# Patient Record
Sex: MALE | Race: WHITE | NOT HISPANIC OR LATINO | Employment: OTHER | URBAN - METROPOLITAN AREA
[De-identification: names, ages, dates, MRNs, and addresses within clinical notes are randomized per-mention and may not be internally consistent; named-entity substitution may affect disease eponyms.]

---

## 2018-04-03 ENCOUNTER — APPOINTMENT (OUTPATIENT)
Dept: CT IMAGING | Facility: HOSPITAL | Age: 63
End: 2018-04-03

## 2018-04-03 ENCOUNTER — HOSPITAL ENCOUNTER (INPATIENT)
Facility: HOSPITAL | Age: 63
LOS: 2 days | Discharge: HOME | End: 2018-04-05
Attending: EMERGENCY MEDICINE | Admitting: HOSPITALIST

## 2018-04-03 DIAGNOSIS — E86.0 DEHYDRATION: ICD-10-CM

## 2018-04-03 DIAGNOSIS — E87.29 ALCOHOLIC KETOACIDOSIS: ICD-10-CM

## 2018-04-03 DIAGNOSIS — F10.920 ALCOHOLIC INTOXICATION WITHOUT COMPLICATION (HCC): Primary | ICD-10-CM

## 2018-04-03 LAB
ALBUMIN SERPL-MCNC: 4.1 G/DL (ref 3.5–5.2)
ALBUMIN/GLOB SERPL: 1.5 G/DL
ALP SERPL-CCNC: 58 U/L (ref 39–117)
ALT SERPL W P-5'-P-CCNC: 46 U/L (ref 1–41)
AMPHET+METHAMPHET UR QL: NEGATIVE
ANION GAP SERPL CALCULATED.3IONS-SCNC: 28.5 MMOL/L
AST SERPL-CCNC: 46 U/L (ref 1–40)
BARBITURATES UR QL SCN: NEGATIVE
BASOPHILS # BLD AUTO: 0.06 10*3/MM3 (ref 0–0.2)
BASOPHILS NFR BLD AUTO: 0.6 % (ref 0–1.5)
BENZODIAZ UR QL SCN: POSITIVE
BILIRUB SERPL-MCNC: 0.5 MG/DL (ref 0.1–1.2)
BUN BLD-MCNC: 38 MG/DL (ref 8–23)
BUN/CREAT SERPL: 18.7 (ref 7–25)
CALCIUM SPEC-SCNC: 8.6 MG/DL (ref 8.6–10.5)
CANNABINOIDS SERPL QL: NEGATIVE
CHLORIDE SERPL-SCNC: 95 MMOL/L (ref 98–107)
CO2 SERPL-SCNC: 13.5 MMOL/L (ref 22–29)
COCAINE UR QL: NEGATIVE
CREAT BLD-MCNC: 2.03 MG/DL (ref 0.76–1.27)
DEPRECATED RDW RBC AUTO: 43.7 FL (ref 37–54)
EOSINOPHIL # BLD AUTO: 0.01 10*3/MM3 (ref 0–0.7)
EOSINOPHIL NFR BLD AUTO: 0.1 % (ref 0.3–6.2)
ERYTHROCYTE [DISTWIDTH] IN BLOOD BY AUTOMATED COUNT: 13.2 % (ref 11.5–14.5)
ETHANOL BLD-MCNC: 254 MG/DL (ref 0–10)
ETHANOL UR QL: 0.25 %
GFR SERPL CREATININE-BSD FRML MDRD: 34 ML/MIN/1.73
GLOBULIN UR ELPH-MCNC: 2.8 GM/DL
GLUCOSE BLD-MCNC: 70 MG/DL (ref 65–99)
HCT VFR BLD AUTO: 45.1 % (ref 40.4–52.2)
HGB BLD-MCNC: 15.4 G/DL (ref 13.7–17.6)
IMM GRANULOCYTES # BLD: 0.03 10*3/MM3 (ref 0–0.03)
IMM GRANULOCYTES NFR BLD: 0.3 % (ref 0–0.5)
LIPASE SERPL-CCNC: 81 U/L (ref 13–60)
LYMPHOCYTES # BLD AUTO: 3.69 10*3/MM3 (ref 0.9–4.8)
LYMPHOCYTES NFR BLD AUTO: 34.6 % (ref 19.6–45.3)
MCH RBC QN AUTO: 31.6 PG (ref 27–32.7)
MCHC RBC AUTO-ENTMCNC: 34.1 G/DL (ref 32.6–36.4)
MCV RBC AUTO: 92.4 FL (ref 79.8–96.2)
METHADONE UR QL SCN: NEGATIVE
MONOCYTES # BLD AUTO: 0.38 10*3/MM3 (ref 0.2–1.2)
MONOCYTES NFR BLD AUTO: 3.6 % (ref 5–12)
NEUTROPHILS # BLD AUTO: 6.5 10*3/MM3 (ref 1.9–8.1)
NEUTROPHILS NFR BLD AUTO: 60.8 % (ref 42.7–76)
OPIATES UR QL: NEGATIVE
OXYCODONE UR QL SCN: NEGATIVE
PLATELET # BLD AUTO: 241 10*3/MM3 (ref 140–500)
PMV BLD AUTO: 9.4 FL (ref 6–12)
POTASSIUM BLD-SCNC: 3.5 MMOL/L (ref 3.5–5.2)
PROT SERPL-MCNC: 6.9 G/DL (ref 6–8.5)
RBC # BLD AUTO: 4.88 10*6/MM3 (ref 4.6–6)
SODIUM BLD-SCNC: 137 MMOL/L (ref 136–145)
WBC NRBC COR # BLD: 10.67 10*3/MM3 (ref 4.5–10.7)

## 2018-04-03 PROCEDURE — 80307 DRUG TEST PRSMV CHEM ANLYZR: CPT | Performed by: EMERGENCY MEDICINE

## 2018-04-03 PROCEDURE — 85025 COMPLETE CBC W/AUTO DIFF WBC: CPT | Performed by: EMERGENCY MEDICINE

## 2018-04-03 PROCEDURE — 74176 CT ABD & PELVIS W/O CONTRAST: CPT

## 2018-04-03 PROCEDURE — 83690 ASSAY OF LIPASE: CPT | Performed by: EMERGENCY MEDICINE

## 2018-04-03 PROCEDURE — 80053 COMPREHEN METABOLIC PANEL: CPT | Performed by: EMERGENCY MEDICINE

## 2018-04-03 PROCEDURE — 25010000002 THIAMINE PER 100 MG: Performed by: EMERGENCY MEDICINE

## 2018-04-03 PROCEDURE — 99284 EMERGENCY DEPT VISIT MOD MDM: CPT

## 2018-04-03 PROCEDURE — 93010 ELECTROCARDIOGRAM REPORT: CPT | Performed by: INTERNAL MEDICINE

## 2018-04-03 PROCEDURE — 25010000002 MAGNESIUM SULFATE PER 500 MG OF MAGNESIUM: Performed by: EMERGENCY MEDICINE

## 2018-04-03 PROCEDURE — 93005 ELECTROCARDIOGRAM TRACING: CPT | Performed by: EMERGENCY MEDICINE

## 2018-04-03 RX ORDER — LISINOPRIL 10 MG/1
10 TABLET ORAL DAILY
COMMUNITY
End: 2018-04-05 | Stop reason: HOSPADM

## 2018-04-03 RX ADMIN — SODIUM CHLORIDE 1000 ML: 9 INJECTION, SOLUTION INTRAVENOUS at 19:03

## 2018-04-03 RX ADMIN — FOLIC ACID 1000 ML/HR: 5 INJECTION, SOLUTION INTRAMUSCULAR; INTRAVENOUS; SUBCUTANEOUS at 23:01

## 2018-04-03 NOTE — ED NOTES
Pt notified of need for urine specimen. IV fluids infusing.     Barby Kilpatrick RN  04/03/18 2944

## 2018-04-03 NOTE — ED PROVIDER NOTES
"EMERGENCY DEPARTMENT ENCOUNTER    CHIEF COMPLAINT  Chief Complaint: Abdominal pain  History given by: pt/ spouse is present at bedside   History limited by: N/A  Room Number: 05/05  PMD: Dale Kellogg MD      HPI:  Pt is a 62 y.o. male who presents complaining of constant diffuse abdominal pain that has been ongoing for approximately 3 days. Pt has a hx of alcohol abuse with heavy alcohol consumption that has been ongoing for approximately 2 weeks. Pt states his last alcoholic beverage was yesterday. Pt also c/o nausea/ dry-heaving, and diarrhea, but denies any other pertinent symptoms. Spouse reports decreased PO intake.     Duration: 3 days   Onset: gradual  Timing: constant   Location: diffuse   Radiation: None reported  Quality: \"pain\"  Intensity/Severity: moderate   Progression: unchanged   Associated Symptoms: N/V/D, decreased PO intake   Aggravating Factors: Heavily drinking for approximately 2 weeks   Alleviating Factors: None reported   Previous Episodes: Pt has a hx of alcohol abuse.   Treatment before arrival: Pt states his last alcoholic beverage was yesterday.    PAST MEDICAL HISTORY  Active Ambulatory Problems     Diagnosis Date Noted   • No Active Ambulatory Problems     Resolved Ambulatory Problems     Diagnosis Date Noted   • No Resolved Ambulatory Problems     Past Medical History:   Diagnosis Date   • Hyperlipidemia    • Hypertension        PAST SURGICAL HISTORY  Past Surgical History:   Procedure Laterality Date   • SHOULDER SURGERY     • TOTAL HIP ARTHROPLASTY         FAMILY HISTORY  History reviewed. No pertinent family history.    SOCIAL HISTORY  Social History     Social History   • Marital status:      Spouse name: N/A   • Number of children: N/A   • Years of education: N/A     Occupational History   • Not on file.     Social History Main Topics   • Smoking status: Current Every Day Smoker     Packs/day: 0.50   • Smokeless tobacco: Not on file   • Alcohol use Yes      Comment: 2-3 " half pints whisky    • Drug use: No   • Sexual activity: Defer     Other Topics Concern   • Not on file     Social History Narrative   • No narrative on file       ALLERGIES  Review of patient's allergies indicates no known allergies.    REVIEW OF SYSTEMS  Review of Systems   Constitutional: Positive for appetite change (decreased PO intake ). Negative for chills and fever.   HENT: Negative.  Negative for sore throat.    Eyes: Negative.    Respiratory: Negative.  Negative for cough.    Cardiovascular: Negative.  Negative for chest pain.   Gastrointestinal: Positive for abdominal pain, diarrhea, nausea and vomiting.   Genitourinary: Negative.  Negative for dysuria.   Musculoskeletal: Negative.  Negative for back pain.   Skin: Negative.  Negative for rash.   Neurological: Negative.  Negative for headaches.       PHYSICAL EXAM  ED Triage Vitals   Temp Heart Rate Resp BP SpO2   04/03/18 1826 04/03/18 1826 04/03/18 1825 04/03/18 1832 04/03/18 1826   97.9 °F (36.6 °C) (!) 128 20 106/84 99 %      Temp src Heart Rate Source Patient Position BP Location FiO2 (%)   04/03/18 1826 04/03/18 1826 -- -- --   Tympanic Monitor          Physical Exam   Constitutional: He is oriented to person, place, and time and well-developed, well-nourished, and in no distress.   Current BP = 89/67   HENT:   Head: Normocephalic and atraumatic.   Dry oral mucosa    Eyes: EOM are normal. Pupils are equal, round, and reactive to light.   Neck: Normal range of motion. Neck supple.   Cardiovascular: Normal rate, regular rhythm, normal heart sounds and intact distal pulses.    Pulmonary/Chest: Effort normal and breath sounds normal. No respiratory distress.   Abdominal: Soft. There is tenderness (diffuse ). There is no rebound and no guarding.   Musculoskeletal: Normal range of motion. He exhibits no edema.   Neurological: He is alert and oriented to person, place, and time. He has normal sensation and normal strength.   Skin: Skin is warm and dry.    Psychiatric: Mood and affect normal.   Nursing note and vitals reviewed.      LAB RESULTS  Lab Results (last 24 hours)     Procedure Component Value Units Date/Time    CBC & Differential [[id number]] Collected:  04/03/18 1902    Specimen:  Blood Updated:  04/03/18 1915    Narrative:       The following orders were created for panel order CBC & Differential.  Procedure                               Abnormality         Status                     ---------                               -----------         ------                     CBC Auto Differential[[id number]]        Abnormal            Final result                 Please view results for these tests on the individual orders.    Comprehensive Metabolic Panel [[id number]]  (Abnormal) Collected:  04/03/18 1902    Specimen:  Blood Updated:  04/03/18 2113     Glucose 70 mg/dL      BUN 38 (H) mg/dL      Creatinine 2.03 (H) mg/dL      Sodium 137 mmol/L      Potassium 3.5 mmol/L      Chloride 95 (L) mmol/L      CO2 13.5 (L) mmol/L      Calcium 8.6 mg/dL      Total Protein 6.9 g/dL      Albumin 4.10 g/dL      ALT (SGPT) 46 (H) U/L      AST (SGOT) 46 (H) U/L      Alkaline Phosphatase 58 U/L      Total Bilirubin 0.5 mg/dL      eGFR Non African Amer 34 (L) mL/min/1.73      Globulin 2.8 gm/dL      A/G Ratio 1.5 g/dL      BUN/Creatinine Ratio 18.7     Anion Gap 28.5 mmol/L     Lipase [[id number]]  (Abnormal) Collected:  04/03/18 1902    Specimen:  Blood Updated:  04/03/18 1935     Lipase 81 (H) U/L     Ethanol [[id number]]  (Abnormal) Collected:  04/03/18 1902    Specimen:  Blood Updated:  04/03/18 1956     Ethanol 254 (C) mg/dL      Ethanol % 0.254 %     CBC Auto Differential [[id number]]  (Abnormal) Collected:  04/03/18 1902    Specimen:  Blood Updated:  04/03/18 1915     WBC 10.67 10*3/mm3      RBC 4.88 10*6/mm3      Hemoglobin 15.4 g/dL      Hematocrit 45.1 %      MCV 92.4 fL      MCH 31.6 pg      MCHC 34.1 g/dL      RDW 13.2 %      RDW-SD 43.7 fl      MPV 9.4 fL       Platelets 241 10*3/mm3      Neutrophil % 60.8 %      Lymphocyte % 34.6 %      Monocyte % 3.6 (L) %      Eosinophil % 0.1 (L) %      Basophil % 0.6 %      Immature Grans % 0.3 %      Neutrophils, Absolute 6.50 10*3/mm3      Lymphocytes, Absolute 3.69 10*3/mm3      Monocytes, Absolute 0.38 10*3/mm3      Eosinophils, Absolute 0.01 10*3/mm3      Basophils, Absolute 0.06 10*3/mm3      Immature Grans, Absolute 0.03 10*3/mm3     Urine Drug Screen - Urine, Clean Catch [[id number]]  (Abnormal) Collected:  04/03/18 1910    Specimen:  Urine from Urine, Clean Catch Updated:  04/03/18 1943     Amphet/Methamphet, Screen Negative     Barbiturates Screen, Urine Negative     Benzodiazepine Screen, Urine Positive (A)     Cocaine Screen, Urine Negative     Opiate Screen Negative     THC, Screen, Urine Negative     Methadone Screen, Urine Negative     Oxycodone Screen, Urine Negative    Narrative:       Negative Thresholds For Drugs Screened:     Amphetamines               500 ng/ml   Barbiturates               200 ng/ml   Benzodiazepines            100 ng/ml   Cocaine                    300 ng/ml   Methadone                  300 ng/ml   Opiates                    300 ng/ml   Oxycodone                  100 ng/ml   THC                        50 ng/ml    The Normal Value for all drugs tested is negative. This report includes final unconfirmed screening results to be used for medical treatment purposes only. Unconfirmed results must not be used for non-medical purposes such as employment or legal testing. Clinical consideration should be applied to any drug of abuse test, particulary when unconfirmed results are used.          I ordered the above labs and reviewed the results    RADIOLOGY  CT Abdomen Pelvis Without Contrast   Final Result   1.  Extensive fatty liver.   2. Colonic diverticulosis                   This study was performed with techniques to keep radiation doses as low   as reasonably achievable (ALARA). Individualized dose  reduction   techniques using automated exposure control or adjustment of mA and/or   kV according to the patient size were employed.        This report was finalized on 4/3/2018 9:40 PM by Reginaldo Segura MD.               I ordered the above noted radiological studies. Interpreted by radiologist. Reviewed by me in PACS.       PROCEDURES  Procedures  EKG           EKG time: 1917  Rhythm/Rate: sinus rhythm, 88  P waves and FL: first degree AV block  QRS, axis: normal   ST and T waves: Diffuse flattened T waves      Interpreted Contemporaneously by me, independently viewed  No previous for comparison     PROGRESS AND CONSULTS  ED Course   Comment By Time   10:15 PM  Patient here for abdominal pain with excessive alcohol consumption.  Is dehydrated and given fluids.  Appears to be in AKA.  Will start banana bag.  At risk for withdrawal.  Discussed with Dr. Fang who will admit Mike Li MD 04/03 2214 1850  EValuated pt and discussed that pt's hypotension is most likely due to decreased PO intake. Discussed concern for pancreatitis due to pt's alcohol abuse hx. Discussed plan to administer IVF fluids and to perform CT abdomen/ pelvis for further evaluation. PT agrees to plan.    1856  Ordered IVF and labs for further evaluation.     2057  Ordered CT abdomen/ pelvis without contrast due to elevated kidney function labs.     2149  Rechecked pt who is in no apparent distress. Discussed that CT abdomen/ pelvis shows fatty liver, but otherwise unremarkable. Discussed that pt is currently in AKA and has elevated kidney function labs indicative of dehydration. Discussed plan to admit pt. PT understands and agrees to plan, all questions addressed at this time.    2152  Ordered banana bag. Placed consult to A.     2214  Discussed pt's case with Dr. Fang [[facility]] who agrees to admit.     MEDICAL DECISION MAKING  Results were reviewed/discussed with the patient and they were also made aware of online access. Pt also  made aware that some labs, such as cultures, will not be resulted during ER visit and follow up with PMD is necessary.     MDM  Number of Diagnoses or Management Options     Amount and/or Complexity of Data Reviewed  Clinical lab tests: ordered and reviewed (Positive for Benzo, Ethanol = 254, Lipase = 81, BUN = 38, Creatinine= 2.03)  Tests in the radiology section of CPT®: ordered and reviewed (CT abdomen/ pelvis   FINDINGS ABDOMEN CT:  Extensive fatty liver. No nephrolithiasis or hydronephrosis. Remaining solid organs are otherwise unremarkable without benefit of IV contrast. Encompassed aorta is non-aneurysmal. No  ascites. Normal appendix.     FINDINGS PELVIS CT:  Extensive artifact from left hip arthroplasty. No large pelvic mass or fluid collection. Numerous colonic diverticuli. The GI tract not opacified for assessment but non obstructive in appearance.)  Tests in the medicine section of CPT®: ordered and reviewed (Refer to procedure )  Discussion of test results with the performing providers: yes (Dr. Fang ([facility]) )  Independent visualization of images, tracings, or specimens: yes           DIAGNOSIS  Final diagnoses:   Alcoholic intoxication without complication   Dehydration   Alcoholic ketoacidosis       DISPOSITION  ADMISSION    Discussed treatment plan and reason for admission with pt/family and admitting physician.  Pt/family voiced understanding of the plan for admission for further testing/treatment as needed.     Latest Documented Vital Signs:  As of 11:06 PM  BP- 94/73 HR- 105 Temp- 97.9 °F (36.6 °C) (Tympanic) O2 sat- 99%    --  Documentation assistance provided by jerrell Dominguez for Dr. Li.  Information recorded by the scribe was done at my direction and has been verified and validated by me.                   Presley Dominguez  04/03/18 4593       Mike Li MD  04/03/18 1999

## 2018-04-04 ENCOUNTER — APPOINTMENT (OUTPATIENT)
Dept: ULTRASOUND IMAGING | Facility: HOSPITAL | Age: 63
End: 2018-04-04

## 2018-04-04 PROBLEM — K29.20 ALCOHOLIC GASTRITIS: Status: ACTIVE | Noted: 2018-04-04

## 2018-04-04 PROBLEM — R11.2 NAUSEA & VOMITING: Status: ACTIVE | Noted: 2018-04-04

## 2018-04-04 PROBLEM — F10.939 ALCOHOL WITHDRAWAL (HCC): Status: ACTIVE | Noted: 2018-04-04

## 2018-04-04 PROBLEM — I95.9 HYPOTENSION: Status: ACTIVE | Noted: 2018-04-04

## 2018-04-04 PROBLEM — E83.39 HYPOPHOSPHATEMIA: Status: ACTIVE | Noted: 2018-04-04

## 2018-04-04 PROBLEM — R79.89 LFTS ABNORMAL: Status: ACTIVE | Noted: 2018-04-04

## 2018-04-04 PROBLEM — R00.0 TACHYCARDIA: Status: ACTIVE | Noted: 2018-04-04

## 2018-04-04 PROBLEM — N17.9 ARF (ACUTE RENAL FAILURE) (HCC): Status: ACTIVE | Noted: 2018-04-04

## 2018-04-04 LAB
ALBUMIN SERPL-MCNC: 3.5 G/DL (ref 3.5–5.2)
ALBUMIN/GLOB SERPL: 1.5 G/DL
ALP SERPL-CCNC: 52 U/L (ref 39–117)
ALT SERPL W P-5'-P-CCNC: 32 U/L (ref 1–41)
ANION GAP SERPL CALCULATED.3IONS-SCNC: 25 MMOL/L
AST SERPL-CCNC: 36 U/L (ref 1–40)
BILIRUB SERPL-MCNC: 0.8 MG/DL (ref 0.1–1.2)
BUN BLD-MCNC: 28 MG/DL (ref 8–23)
BUN/CREAT SERPL: 26.9 (ref 7–25)
CALCIUM SPEC-SCNC: 7.6 MG/DL (ref 8.6–10.5)
CHLORIDE SERPL-SCNC: 98 MMOL/L (ref 98–107)
CO2 SERPL-SCNC: 14 MMOL/L (ref 22–29)
CREAT BLD-MCNC: 1.04 MG/DL (ref 0.76–1.27)
GFR SERPL CREATININE-BSD FRML MDRD: 73 ML/MIN/1.73
GLOBULIN UR ELPH-MCNC: 2.3 GM/DL
GLUCOSE BLD-MCNC: 50 MG/DL (ref 65–99)
GLUCOSE BLDC GLUCOMTR-MCNC: 69 MG/DL (ref 70–130)
GLUCOSE BLDC GLUCOMTR-MCNC: 79 MG/DL (ref 70–130)
GLUCOSE BLDC GLUCOMTR-MCNC: 90 MG/DL (ref 70–130)
MAGNESIUM SERPL-MCNC: 2.4 MG/DL (ref 1.6–2.4)
PHOSPHATE SERPL-MCNC: 1.9 MG/DL (ref 2.5–4.5)
POTASSIUM BLD-SCNC: 4 MMOL/L (ref 3.5–5.2)
PROT SERPL-MCNC: 5.8 G/DL (ref 6–8.5)
SODIUM BLD-SCNC: 137 MMOL/L (ref 136–145)

## 2018-04-04 PROCEDURE — 84100 ASSAY OF PHOSPHORUS: CPT | Performed by: HOSPITALIST

## 2018-04-04 PROCEDURE — 25010000002 THIAMINE PER 100 MG: Performed by: HOSPITALIST

## 2018-04-04 PROCEDURE — 25010000002 ONDANSETRON PER 1 MG: Performed by: HOSPITALIST

## 2018-04-04 PROCEDURE — 83735 ASSAY OF MAGNESIUM: CPT | Performed by: HOSPITALIST

## 2018-04-04 PROCEDURE — 76705 ECHO EXAM OF ABDOMEN: CPT

## 2018-04-04 PROCEDURE — 90791 PSYCH DIAGNOSTIC EVALUATION: CPT | Performed by: SOCIAL WORKER

## 2018-04-04 PROCEDURE — 25010000002 MAGNESIUM SULFATE PER 500 MG OF MAGNESIUM: Performed by: HOSPITALIST

## 2018-04-04 PROCEDURE — 80053 COMPREHEN METABOLIC PANEL: CPT | Performed by: HOSPITALIST

## 2018-04-04 PROCEDURE — 25010000002 LORAZEPAM PER 2 MG: Performed by: HOSPITALIST

## 2018-04-04 PROCEDURE — 82962 GLUCOSE BLOOD TEST: CPT

## 2018-04-04 RX ORDER — LORAZEPAM 1 MG/1
2 TABLET ORAL
Status: DISCONTINUED | OUTPATIENT
Start: 2018-04-04 | End: 2018-04-05 | Stop reason: HOSPADM

## 2018-04-04 RX ORDER — HYDROCODONE BITARTRATE AND ACETAMINOPHEN 7.5; 325 MG/1; MG/1
1 TABLET ORAL EVERY 6 HOURS PRN
Status: DISCONTINUED | OUTPATIENT
Start: 2018-04-04 | End: 2018-04-05 | Stop reason: HOSPADM

## 2018-04-04 RX ORDER — SODIUM CHLORIDE 9 MG/ML
125 INJECTION, SOLUTION INTRAVENOUS CONTINUOUS
Status: DISCONTINUED | OUTPATIENT
Start: 2018-04-04 | End: 2018-04-05 | Stop reason: HOSPADM

## 2018-04-04 RX ORDER — THIAMINE MONONITRATE (VIT B1) 100 MG
100 TABLET ORAL DAILY
Status: DISCONTINUED | OUTPATIENT
Start: 2018-04-04 | End: 2018-04-05 | Stop reason: HOSPADM

## 2018-04-04 RX ORDER — LORAZEPAM 2 MG/ML
2 INJECTION INTRAMUSCULAR
Status: DISCONTINUED | OUTPATIENT
Start: 2018-04-04 | End: 2018-04-05 | Stop reason: HOSPADM

## 2018-04-04 RX ORDER — FAMOTIDINE 10 MG/ML
20 INJECTION, SOLUTION INTRAVENOUS EVERY 12 HOURS SCHEDULED
Status: DISCONTINUED | OUTPATIENT
Start: 2018-04-04 | End: 2018-04-05 | Stop reason: HOSPADM

## 2018-04-04 RX ORDER — NITROGLYCERIN 0.4 MG/1
0.4 TABLET SUBLINGUAL
Status: DISCONTINUED | OUTPATIENT
Start: 2018-04-04 | End: 2018-04-05 | Stop reason: HOSPADM

## 2018-04-04 RX ORDER — FOLIC ACID 1 MG/1
1 TABLET ORAL DAILY
Status: DISCONTINUED | OUTPATIENT
Start: 2018-04-04 | End: 2018-04-05 | Stop reason: HOSPADM

## 2018-04-04 RX ORDER — CHLORDIAZEPOXIDE HYDROCHLORIDE 25 MG/1
25 CAPSULE, GELATIN COATED ORAL EVERY 6 HOURS SCHEDULED
Status: DISCONTINUED | OUTPATIENT
Start: 2018-04-04 | End: 2018-04-04

## 2018-04-04 RX ORDER — DIPHENOXYLATE HYDROCHLORIDE AND ATROPINE SULFATE 2.5; .025 MG/1; MG/1
1 TABLET ORAL DAILY
Status: DISCONTINUED | OUTPATIENT
Start: 2018-04-04 | End: 2018-04-05 | Stop reason: HOSPADM

## 2018-04-04 RX ORDER — ACETAMINOPHEN 325 MG/1
650 TABLET ORAL EVERY 4 HOURS PRN
Status: DISCONTINUED | OUTPATIENT
Start: 2018-04-04 | End: 2018-04-05 | Stop reason: HOSPADM

## 2018-04-04 RX ORDER — ONDANSETRON 4 MG/1
4 TABLET, ORALLY DISINTEGRATING ORAL EVERY 6 HOURS PRN
Status: DISCONTINUED | OUTPATIENT
Start: 2018-04-04 | End: 2018-04-05 | Stop reason: HOSPADM

## 2018-04-04 RX ORDER — ONDANSETRON 2 MG/ML
4 INJECTION INTRAMUSCULAR; INTRAVENOUS EVERY 6 HOURS PRN
Status: DISCONTINUED | OUTPATIENT
Start: 2018-04-04 | End: 2018-04-05 | Stop reason: HOSPADM

## 2018-04-04 RX ORDER — LORAZEPAM 1 MG/1
1 TABLET ORAL
Status: DISCONTINUED | OUTPATIENT
Start: 2018-04-04 | End: 2018-04-05 | Stop reason: HOSPADM

## 2018-04-04 RX ORDER — ONDANSETRON 4 MG/1
4 TABLET, FILM COATED ORAL EVERY 6 HOURS PRN
Status: DISCONTINUED | OUTPATIENT
Start: 2018-04-04 | End: 2018-04-05 | Stop reason: HOSPADM

## 2018-04-04 RX ORDER — SODIUM CHLORIDE 0.9 % (FLUSH) 0.9 %
1-10 SYRINGE (ML) INJECTION AS NEEDED
Status: DISCONTINUED | OUTPATIENT
Start: 2018-04-04 | End: 2018-04-05 | Stop reason: HOSPADM

## 2018-04-04 RX ORDER — LORAZEPAM 2 MG/ML
1 INJECTION INTRAMUSCULAR
Status: DISCONTINUED | OUTPATIENT
Start: 2018-04-04 | End: 2018-04-05 | Stop reason: HOSPADM

## 2018-04-04 RX ADMIN — Medication 100 MG: at 08:14

## 2018-04-04 RX ADMIN — Medication 1 TABLET: at 08:14

## 2018-04-04 RX ADMIN — DIBASIC SODIUM PHOSPHATE, MONOBASIC POTASSIUM PHOSPHATE AND MONOBASIC SODIUM PHOSPHATE 2 TABLET: 852; 155; 130 TABLET ORAL at 18:19

## 2018-04-04 RX ADMIN — FAMOTIDINE 20 MG: 10 INJECTION INTRAVENOUS at 03:53

## 2018-04-04 RX ADMIN — ACETAMINOPHEN 650 MG: 325 TABLET ORAL at 08:14

## 2018-04-04 RX ADMIN — LORAZEPAM 1 MG: 2 INJECTION INTRAMUSCULAR; INTRAVENOUS at 08:46

## 2018-04-04 RX ADMIN — SODIUM CHLORIDE 125 ML/HR: 9 INJECTION, SOLUTION INTRAVENOUS at 01:37

## 2018-04-04 RX ADMIN — FOLIC ACID 100 ML/HR: 5 INJECTION, SOLUTION INTRAMUSCULAR; INTRAVENOUS; SUBCUTANEOUS at 12:00

## 2018-04-04 RX ADMIN — ACETAMINOPHEN 650 MG: 325 TABLET ORAL at 20:29

## 2018-04-04 RX ADMIN — DIBASIC SODIUM PHOSPHATE, MONOBASIC POTASSIUM PHOSPHATE AND MONOBASIC SODIUM PHOSPHATE 2 TABLET: 852; 155; 130 TABLET ORAL at 20:30

## 2018-04-04 RX ADMIN — FAMOTIDINE 20 MG: 10 INJECTION INTRAVENOUS at 20:30

## 2018-04-04 RX ADMIN — ONDANSETRON 4 MG: 2 INJECTION INTRAMUSCULAR; INTRAVENOUS at 08:14

## 2018-04-04 RX ADMIN — FOLIC ACID 1 MG: 1 TABLET ORAL at 08:14

## 2018-04-04 RX ADMIN — CHLORDIAZEPOXIDE HYDROCHLORIDE 25 MG: 25 CAPSULE ORAL at 06:01

## 2018-04-04 RX ADMIN — DIBASIC SODIUM PHOSPHATE, MONOBASIC POTASSIUM PHOSPHATE AND MONOBASIC SODIUM PHOSPHATE 2 TABLET: 852; 155; 130 TABLET ORAL at 12:09

## 2018-04-04 RX ADMIN — LORAZEPAM 1 MG: 2 INJECTION INTRAMUSCULAR; INTRAVENOUS at 20:29

## 2018-04-04 RX ADMIN — CHLORDIAZEPOXIDE HYDROCHLORIDE 25 MG: 25 CAPSULE ORAL at 01:37

## 2018-04-04 RX ADMIN — FAMOTIDINE 20 MG: 10 INJECTION INTRAVENOUS at 08:14

## 2018-04-04 NOTE — PLAN OF CARE
Problem: Patient Care Overview  Goal: Plan of Care Review  Outcome: Ongoing (interventions implemented as appropriate)   04/04/18 0359   Coping/Psychosocial   Plan of Care Reviewed With patient   Plan of Care Review   Progress no change   OTHER   Outcome Summary Pt admitted for ARF. Also alcohol withdrawal. Librium started. VSS. Will continue to monitor.     Goal: Individualization and Mutuality  Outcome: Ongoing (interventions implemented as appropriate)    Goal: Discharge Needs Assessment  Outcome: Ongoing (interventions implemented as appropriate)      Problem: Alcohol Withdrawal Acute, Risk/Actual (Adult)  Goal: Signs and Symptoms of Listed Potential Problems Will be Absent, Minimized or Managed (Alcohol Withdrawal Acute, Risk/Actual)  Outcome: Ongoing (interventions implemented as appropriate)      Problem: Fluid Volume Deficit (Adult)  Goal: Identify Related Risk Factors and Signs and Symptoms  Outcome: Ongoing (interventions implemented as appropriate)    Goal: Optimal Fluid Balance  Outcome: Ongoing (interventions implemented as appropriate)

## 2018-04-04 NOTE — H&P
HISTORY AND PHYSICAL   [facility]        Patient Identification:  Name: Dylon Lux  Age: 62 y.o.  Sex: male  :  1955  MRN: [id number]                     Primary Care Physician: Dale Kellogg MD    Chief Complaint:  Nausea vomiting and abdominal pain    History of Present Illness:   Mr Lux is a 62-year-old male who has a relatively benign past medical history of which he takes lisinopril for hypertension.  Patient unfortunately has been dealing with alcohol abuse his whole life.  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  Patient with his sobriety last year for approximate 3-4 months through .  Unfortunately he went back to alcohol abuse but did so secretively as his family was unaware.  Patient is now consuming 1-2+ pints of bourbon on a daily basis.  Last alcoholic beverage was on Monday.  Patient admits to anxieties and agitation but denies any hallucination.  Patient has since been complaining of nausea vomiting of about a dozen spells a day.  He does not admit to any hematemesis.  He does not admit any recent NSAID use.  He still has his gallbladder.  He denies fever chills or night sweats.  In the ER was started on IV fluids and administered a banana bag as well as anti-emetics.  He was also found to be hypotensive but seems to be responded to IV fluids.  Patient denies any bowel habitus changes such as bright red blood per rectum or melena.  He admits to abdominal pain which is generalized.    Past Medical History:  Past Medical History:   Diagnosis Date   • Hyperlipidemia    • Hypertension      Past Surgical History:  Past Surgical History:   Procedure Laterality Date   • SHOULDER SURGERY     • TOTAL HIP ARTHROPLASTY        Home Meds:  Prescriptions Prior to Admission   Medication Sig Dispense Refill Last Dose   • lisinopril (PRINIVIL,ZESTRIL) 10 MG tablet Take 10 mg by mouth Daily.   4/3/2018 at 0500       Allergies:  No Known  "Allergies  Immunizations:    There is no immunization history on file for this patient.  Social History:   Social History     Social History Narrative   • No narrative on file     Social History     Social History   • Marital status:      Spouse name: N/A   • Number of children: N/A   • Years of education: N/A     Occupational History   • Not on file.     Social History Main Topics   • Smoking status: Current Every Day Smoker     Packs/day: 0.50   • Smokeless tobacco: Not on file   • Alcohol use Yes      Comment: 2-3 half pints whisky    • Drug use: No   • Sexual activity: Defer     Other Topics Concern   • Not on file     Social History Narrative   • No narrative on file       Family History:  History reviewed. No pertinent family history.     Review of Systems  See history of present illness and past medical history.  Patient denies any fever chills night sweats.  Denies focal changes in vision smell taste or sound.  Denies any loss of consciousness.  Denies any chest pain palpitations cough shortness of breath admits to nausea vomiting abdominal pain denies any hematemesis.  Denies any bowel changes such as melena or bright red blood per rectum.  Denies any bruising bleeding or focal loss of function.  Remainder of ROS is negative.    Objective:  tMax 24 hrs: Temp (24hrs), Av.8 °F (36.6 °C), Min:97.7 °F (36.5 °C), Max:97.9 °F (36.6 °C)    Vitals Ranges:   Temp:  [97.7 °F (36.5 °C)-97.9 °F (36.6 °C)] 97.7 °F (36.5 °C)  Heart Rate:  [102-128] 102  Resp:  [18-20] 20  BP: ()/(60-84) 113/79      Exam:  /79 (BP Location: Left arm, Patient Position: Lying)   Pulse 102   Temp 97.7 °F (36.5 °C) (Oral)   Resp 20   Ht 182.9 cm (72\")   Wt 83.9 kg (185 lb)   SpO2 100%   BMI 25.09 kg/m²     General Appearance:    Alert, cooperative, no distress, Not combative, remorseful, alert and oriented ×3, not toxic, no family present at bedside    Head:    Normocephalic, without obvious abnormality, " atraumatic   Eyes:    PERRL, conjunctiva/corneas clear, EOM's intact, both eyes   Ears:    Normal external ear canals, both ears   Nose:   Nares normal, septum midline, mucosa normal, no drainage    or sinus tenderness   Throat:   Lips, mucosa, and tongue normal though mucus membranes are dry   Neck:   Supple, no LAD or JVD       Lungs:     Clear to auscultation bilaterally, respirations unlabored   Chest Wall:    No tenderness or deformity    Heart:    Tachy rate and rhythm, S1 and S2 normal, no murmur   Abdomen:     Soft, Non-impressive abdomen regarding tenderness to palpation as he was still compensating with me while I was palpating his abdomen with no aspects of pain, bowel sounds active all four quadrants, no masses, no hepatomegaly, no splenomegaly   Extremities:   Extremities normal, atraumatic, no cyanosis or edema   Pulses:   2+ and symmetric all extremities           Neurologic:   CNII-XII intact, normal strength, Moving all extremities without focal deficit, did not evaluate gait       .    Data Review:  Labs in chart were reviewed.             Imaging Results (all)     Procedure Component Value Units Date/Time    CT Abdomen Pelvis Without Contrast [[id number]] Collected:  04/03/18 2138     Updated:  04/03/18 2143    Narrative:       NONCONTRAST CT SCANS ABDOMEN AND PELVIS     HISTORY: ab pain     COMPARISON: None.     TECHNIQUE:Radiation dose reduction techniques were utilized, including  automated exposure control and exposure modulation based on body size.   Axial images were obtained from the lung bases to the symphysis pubis  without oral or IV contrast per request.      FINDINGS ABDOMEN CT:  Extensive fatty liver. No nephrolithiasis or  hydronephrosis. Remaining solid organs are otherwise unremarkable  without benefit of IV contrast. Encompassed aorta is non-aneurysmal. No  ascites. Normal appendix.     FINDINGS PELVIS CT:  Extensive artifact from left hip arthroplasty. No  large pelvic mass or  fluid collection. Numerous colonic diverticuli. The  GI tract not opacified for assessment but non obstructive in appearance.                Impression:       1.  Extensive fatty liver.  2. Colonic diverticulosis              This study was performed with techniques to keep radiation doses as low  as reasonably achievable (ALARA). Individualized dose reduction  techniques using automated exposure control or adjustment of mA and/or  kV according to the patient size were employed.      This report was finalized on 4/3/2018 9:40 PM by Reginaldo Segura MD.               Assessment:  Principal Problem:    ARF (acute renal failure)  Active Problems:    Alcoholic intoxication without complication    Nausea & vomiting    Alcoholic gastritis    Hypotension    Tachycardia    LFTs abnormal      Plan:  ARF - likely prerenal exacerbated by N/V (w/out hematemesis)   -monitor crt w/ Mg/Phos    -IVF   -hold ACE   -renal consult if no improvement w/ am labs    Hypotension - IVF/monitor    AA w/out DT's - scheduled Librium - s/p banana bag   -add MVI/folate/thiamine   -Access    N/V w/ probable alcohol induced gastritis    -bowel/clears   -IV Pepcid    -GI consult if declines or develops hematemesis   -check RUQ U/S    LFTs abnormal secondary to the above    Tachycardia - EKG reviewed - expected w/ detox - hold metoprolol or rate limiting drug for now secondary to hypotension    SCDs for DVT ppx    Further recommendations to follow as clinical course unfolds    Dickson Fang MD  4/4/2018  12:11 AM

## 2018-04-04 NOTE — PLAN OF CARE
Problem: Patient Care Overview  Goal: Plan of Care Review  Outcome: Ongoing (interventions implemented as appropriate)   04/04/18 1812   Coping/Psychosocial   Plan of Care Reviewed With patient   Plan of Care Review   Progress no change   OTHER   Outcome Summary CIWAS at highest 10 this AM, Banana bag hung, CIWA down to 3 this afternoon, VSS, will continue to monitor.       Problem: Alcohol Withdrawal Acute, Risk/Actual (Adult)  Goal: Signs and Symptoms of Listed Potential Problems Will be Absent, Minimized or Managed (Alcohol Withdrawal Acute, Risk/Actual)  Outcome: Ongoing (interventions implemented as appropriate)      Problem: Fluid Volume Deficit (Adult)  Goal: Identify Related Risk Factors and Signs and Symptoms  Outcome: Outcome(s) achieved Date Met: 04/04/18    Goal: Optimal Fluid Balance  Outcome: Ongoing (interventions implemented as appropriate)      Problem: Fall Risk (Adult)  Goal: Identify Related Risk Factors and Signs and Symptoms  Outcome: Outcome(s) achieved Date Met: 04/04/18    Goal: Absence of Fall  Outcome: Ongoing (interventions implemented as appropriate)

## 2018-04-04 NOTE — PROGRESS NOTES
"DAILY PROGRESS NOTE  [facility]    Patient Identification:  Name: Dylon Lux  Age: 62 y.o.  Sex: male  :  1955  MRN: [id number]         Primary Care Physician: Dale Kellogg MD      Subjective  No new c/o at present but was having withdrawal sympt earlier this AM and full detox protocol started.  Overall feeling better now.  No further abd pain.  (states it was lower abd).  No BM since adm.      Objective:  General Appearance:  Comfortable, well-appearing, in no acute distress and not in pain.    Vital signs: (most recent): Blood pressure 143/97, pulse 91, temperature 98 °F (36.7 °C), temperature source Oral, resp. rate 20, height 182.9 cm (72\"), weight 83.9 kg (185 lb), SpO2 98 %.    Lungs:  Normal effort and normal respiratory rate.  Breath sounds clear to auscultation.    Heart: Normal rate.  Regular rhythm.    Abdomen: Abdomen is soft and non-distended.  Bowel sounds are normal.   There is no abdominal tenderness.     Extremities: There is no dependent edema.    Neurological: Patient is alert and oriented to person, place and time.    Skin:  Warm and dry.  (Bruise rt lateral zygomatic arch (from  last Monday).)              Vital signs in last 24 hours:  Temp:  [97.5 °F (36.4 °C)-98 °F (36.7 °C)] 98 °F (36.7 °C)  Heart Rate:  [] 91  Resp:  [18-20] 20  BP: ()/(60-97) 143/97    Intake/Output:    Intake/Output Summary (Last 24 hours) at 18 0929  Last data filed at 18 0137   Gross per 24 hour   Intake             2000 ml   Output                0 ml   Net             2000 ml           Results from last 7 days  Lab Units 18  1902   WBC 10*3/mm3 10.67   HEMOGLOBIN g/dL 15.4   PLATELETS 10*3/mm3 241     Results from last 7 days  Lab Units 18  0645 18  1902   SODIUM mmol/L 137 137   POTASSIUM mmol/L 4.0 3.5   CHLORIDE mmol/L 98 95*   CO2 mmol/L 14.0* 13.5*   BUN mg/dL 28* 38*   CREATININE mg/dL 1.04 2.03*   GLUCOSE mg/dL 50* 70   Estimated " Creatinine Clearance: 89.6 mL/min (by C-G formula based on SCr of 1.04 mg/dL).  Results from last 7 days  Lab Units 04/04/18  0645 04/03/18  1902   CALCIUM mg/dL 7.6* 8.6   ALBUMIN g/dL 3.50 4.10   MAGNESIUM mg/dL 2.4  --    PHOSPHORUS mg/dL 1.9*  --      Results from last 7 days  Lab Units 04/04/18  0645 04/03/18  1902   ALBUMIN g/dL 3.50 4.10   BILIRUBIN mg/dL 0.8 0.5   ALK PHOS U/L 52 58   AST (SGOT) U/L 36 46*   ALT (SGPT) U/L 32 46*       Assessment:  Principal Problem:    ARF (acute renal failure) - prerenal:  Improving.   Active Problems:    Etoh withdrawal w hx/o DTs:  Full protocol initiated.     Alcoholic intoxication without complication    Nausea & vomiting    Alcoholic gastritis    Hypotension:  Resolved.     Tachycardia - Improved w hydration.  Expect further issues w withdrawal.     LFTs abnormal - resolved.     Hypophos:  PO replacement.     Elevated Lipase:  No clinical indication of pancreatitis.  Recheck in AM.     Plan:  Please see above.     Clark Gilbert MD  4/4/2018  9:29 AM

## 2018-04-04 NOTE — CONSULTS
"61 y/o cauc  father of 2 adult sons who came to the ED w/ c/o's of abdominal pain that had been ongoing for 2 weeks.  He reports that he has been consuming up to 1.5 pints of liquor / day , with his last drink 2 days ago.  Patient had and ETOH level of 294 in ED last evening.  He denies any hx of sz or DT's when stopping his ETOH consumption.  He reports that he has had some nausea, poor PO intake, and difficulty w/ sleep.  He denies any SI or HI.  No hx of suicide attempts.  He reports he first began consuming ETOH around 20 or 21.  He states that his father drank ETOH a lot.  He tried marijuana in late teens but none since.     Patient reports no prior mental health tx.  He was in the IOP at [facility] July 2017.  He reports approx 60 days abstinent at the time. He did attend AA but never had a sponsor. \"I was not ready to open up\"  He reports that he liked the counselor at [facility] and states he learned a lot.  He states he was released a week early b/c of his progress.  Patient is not willing to commit to another program saying that he learned a lot.  He attended AA for several months after.  He did not get a sponsor.  He reports that his wife drinks wine occasionally.  His 2 adult sons are \"health nuts\" and work out a lot.  He His father drank a lot of ETOH.      Patient lives w/ his wife. He retired from [employer]. He is on SS disability due to shoulder issues.  He is not currently employed.  He likes golf and watching his g-son play ball during the summer.    Patient is alert and O x 4.  He denies any SI or HI.  No a/v hallucinations.  Latest CIWA scores were 10 and 8.  He was engaging.  Limited interest in JADEN tx after discharge at this time. Discussed case w/ Dr. Romo.    He was provided w/ a list of JADEN tx options in [place].HE was also provided education about relapse prevention.  Patient encouraged to consider returning to an IOP program as well as AA. He is open to returning to AA.  He was also " encouraged to get a sponsor and get telephone numbers of people to call.  He states that he was provided numbers when he attended AA before.  [facility] Center will follow.

## 2018-04-04 NOTE — PROGRESS NOTES
Discharge Planning Assessment  [facility]     Patient Name: Dylon Lux  MRN: [id number]  Today's Date: 4/4/2018    Admit Date: 4/3/2018          Discharge Needs Assessment     Row Name 04/04/18 1112       Living Environment    Lives With spouse    Current Living Arrangements home/apartment/condo    Primary Care Provided by self    Provides Primary Care For no one    Family Caregiver if Needed none    Able to Return to Prior Arrangements yes       Resource/Environmental Concerns    Resource/Environmental Concerns none    Transportation Concerns car, none       Transition Planning    Patient/Family Anticipates Transition to home    Patient/Family Anticipated Services at Transition none    Transportation Anticipated family or friend will provide;car, drives self       Discharge Needs Assessment    Readmission Within the Last 30 Days no previous admission in last 30 days    Concerns to be Addressed substance/tobacco abuse/use    Concerns Comments ACCESS following    Equipment Currently Used at Home none    Anticipated Changes Related to Illness none    Equipment Needed After Discharge none    Offered/Gave Vendor List no    Current Discharge Risk substance use/abuse    Discharge Coordination/Progress ACCESS following            Discharge Plan    No documentation.       Destination     No service coordination in this encounter.      Durable Medical Equipment     No service coordination in this encounter.      Dialysis/Infusion     No service coordination in this encounter.      Home Medical Care     No service coordination in this encounter.      Social Care     No service coordination in this encounter.                Demographic Summary     Row Name 04/04/18 1111       General Information    Admission Type inpatient    Arrived From home    Required Notices Provided Important Message from Medicare    Referral Source admission list    Reason for Consult discharge planning    Preferred Language English      Used During This Interaction no            Functional Status     Row Name 04/04/18 1112       Functional Status    Usual Activity Tolerance good    Current Activity Tolerance moderate       Functional Status, IADL    Medications independent    Meal Preparation independent    Housekeeping independent    Laundry independent    Shopping independent       Mental Status    General Appearance WDL WDL            Psychosocial    No documentation.           Abuse/Neglect    No documentation.           Legal    No documentation.           Substance Abuse    No documentation.           Patient Forms    No documentation.         Heydi Lindsey RN

## 2018-04-05 VITALS
OXYGEN SATURATION: 95 % | TEMPERATURE: 98 F | DIASTOLIC BLOOD PRESSURE: 97 MMHG | WEIGHT: 185 LBS | BODY MASS INDEX: 25.06 KG/M2 | HEART RATE: 65 BPM | SYSTOLIC BLOOD PRESSURE: 142 MMHG | HEIGHT: 72 IN | RESPIRATION RATE: 18 BRPM

## 2018-04-05 PROBLEM — F10.920 ALCOHOLIC INTOXICATION WITHOUT COMPLICATION (HCC): Status: RESOLVED | Noted: 2018-04-03 | Resolved: 2018-04-05

## 2018-04-05 PROBLEM — R00.0 TACHYCARDIA: Status: RESOLVED | Noted: 2018-04-04 | Resolved: 2018-04-05

## 2018-04-05 PROBLEM — I95.9 HYPOTENSION: Status: RESOLVED | Noted: 2018-04-04 | Resolved: 2018-04-05

## 2018-04-05 PROBLEM — R79.89 LFTS ABNORMAL: Status: RESOLVED | Noted: 2018-04-04 | Resolved: 2018-04-05

## 2018-04-05 PROBLEM — K70.0 FATTY LIVER, ALCOHOLIC: Status: ACTIVE | Noted: 2018-04-05

## 2018-04-05 LAB
INR PPP: 0.97 (ref 0.9–1.1)
MAGNESIUM SERPL-MCNC: 2.3 MG/DL (ref 1.6–2.4)
PHOSPHATE SERPL-MCNC: 2.6 MG/DL (ref 2.5–4.5)
PROTHROMBIN TIME: 12.6 SECONDS (ref 11.7–14.2)
TSH SERPL DL<=0.05 MIU/L-ACNC: 5.91 MIU/ML (ref 0.27–4.2)

## 2018-04-05 PROCEDURE — 84100 ASSAY OF PHOSPHORUS: CPT | Performed by: HOSPITALIST

## 2018-04-05 PROCEDURE — 85610 PROTHROMBIN TIME: CPT | Performed by: HOSPITALIST

## 2018-04-05 PROCEDURE — 84443 ASSAY THYROID STIM HORMONE: CPT | Performed by: HOSPITALIST

## 2018-04-05 PROCEDURE — 83735 ASSAY OF MAGNESIUM: CPT | Performed by: HOSPITALIST

## 2018-04-05 RX ORDER — LANOLIN ALCOHOL/MO/W.PET/CERES
100 CREAM (GRAM) TOPICAL DAILY
Qty: 30 TABLET | Refills: 0 | Status: SHIPPED | OUTPATIENT
Start: 2018-04-05

## 2018-04-05 RX ORDER — FAMOTIDINE 40 MG/1
40 TABLET, FILM COATED ORAL DAILY
Qty: 15 TABLET | Refills: 0 | Status: SHIPPED | OUTPATIENT
Start: 2018-04-05

## 2018-04-05 RX ORDER — FOLIC ACID 1 MG/1
1 TABLET ORAL DAILY
Qty: 30 TABLET | Refills: 0 | Status: SHIPPED | OUTPATIENT
Start: 2018-04-05

## 2018-04-05 RX ORDER — DIPHENOXYLATE HYDROCHLORIDE AND ATROPINE SULFATE 2.5; .025 MG/1; MG/1
1 TABLET ORAL DAILY
Qty: 30 TABLET | Refills: 0 | Status: SHIPPED | OUTPATIENT
Start: 2018-04-05

## 2018-04-05 NOTE — PROGRESS NOTES
Case Management Discharge Note    Final Note: Patient has been DC'd home.    Destination     No service coordination in this encounter.      Durable Medical Equipment     No service coordination in this encounter.      Dialysis/Infusion     No service coordination in this encounter.      Home Medical Care     No service coordination in this encounter.      Social Care     No service coordination in this encounter.        Other: Other (Private car)    Final Discharge Disposition Code: 01 - home or self-care

## 2018-04-05 NOTE — PLAN OF CARE
Problem: Patient Care Overview  Goal: Plan of Care Review  Outcome: Ongoing (interventions implemented as appropriate)   04/05/18 0510   Coping/Psychosocial   Plan of Care Reviewed With patient;spouse   Plan of Care Review   Progress no change   OTHER   Outcome Summary CIWA highest 6 this shift. New peripheral IV placed.Fluids infusing. Resting well.after onedose of ativan.VSS. Will continnue to monitor closely.     Goal: Individualization and Mutuality  Outcome: Ongoing (interventions implemented as appropriate)    Goal: Discharge Needs Assessment  Outcome: Ongoing (interventions implemented as appropriate)      Problem: Alcohol Withdrawal Acute, Risk/Actual (Adult)  Goal: Signs and Symptoms of Listed Potential Problems Will be Absent, Minimized or Managed (Alcohol Withdrawal Acute, Risk/Actual)  Outcome: Ongoing (interventions implemented as appropriate)      Problem: Fluid Volume Deficit (Adult)  Goal: Optimal Fluid Balance  Outcome: Ongoing (interventions implemented as appropriate)      Problem: Fall Risk (Adult)  Goal: Absence of Fall  Outcome: Ongoing (interventions implemented as appropriate)

## 2018-04-05 NOTE — DISCHARGE SUMMARY
NAME: Dylon Lux ADMIT: 4/3/2018   : 1955  PCP: Dale Kellogg MD    MRN: [id number] LOS: 2 days   AGE/SEX: 62 y.o. male  ROOM: 2/     Date of Admission:  4/3/2018  Date of Discharge:  2018    PCP: Dale Kellogg MD    CHIEF COMPLAINT  Abdominal Pain      DISCHARGE DIAGNOSIS  Active Hospital Problems (** Indicates Principal Problem)    Diagnosis Date Noted   • **ARF (acute renal failure) [N17.9] 2018   • Fatty liver, alcoholic [K70.0] 2018   • Nausea & vomiting [R11.2] 2018   • Alcoholic gastritis [K29.20] 2018   • Alcohol withdrawal [F10.239] 2018   • Hypophosphatemia [E83.39] 2018      Resolved Hospital Problems    Diagnosis Date Noted Date Resolved   • Hypotension [I95.9] 2018   • Tachycardia [R00.0] 2018   • LFTs abnormal [R79.89] 2018   • Alcoholic intoxication without complication [F10.920] 2018       SECONDARY DIAGNOSES  Past Medical History:   Diagnosis Date   • Alcoholism    • Hyperlipidemia    • Hypertension    • Withdrawal symptoms, alcohol        HOSPITAL COURSE  Patient is a 62 y.o. male with history of HTN and alcohol abuse. He had a period of sobriety last year but more recent was back to drinking daily bourbon. He presented to [facility] ER with N/V. He was found to be tachycardic and borderline hypotensive SBPs in the 90s in the ER. He had TERESITA with Cr of 2.    He was admitted. Given IVFs and PPI/Antiemetics for TERESITA and alcoholic gastritis. He was given a couple of doses of benzodiazepam but did not go through any significant withdrawal. He was seen by the [facility]iat[facility] who counseled on recources to alcohol cessation.    By  his renal function returned to normal. He was feeling much better and wished for discharge. TSH is borderline-will have him see Dale Kellogg MD for recheck in the next month. Will have him hold lisinopril currently. He has fatty liver  seen on imaging have counseled him on weight loss and alcohol cessation.    DIAGNOSTICS    US Gallbladder [[id number]] Gerry as Reviewed   Order Status: Completed Collected: 04/04/18 0710    Updated: 04/04/18 0710   Narrative:     LIMITED ABDOMINAL ULTRASOUND     HISTORY: N/V/abdominal pains; F10.920-Alcohol use, unspecified with  intoxication, uncomplicated; E86.0-Dehydration; E87.2-Acidosis     FINDINGS: Longitudinal and transverse images of the right upper quadrant  were obtained. Liver parenchyma is extensively diffusely hyperechoic  suggesting fatty infiltration. There is no focal hepatic mass or biliary  dilatation demonstrated. Gallbladder shows no evidence of cholelithiasis  or wall thickening. There is no pericholecystic fluid. Limited views of  the right kidney and pancreas are unremarkable.          Impression:     1. Extensive fatty infiltration of the liver.  2. Unremarkable gallbladder.             CT Abdomen Pelvis Without Contrast [[id number]] Gerry as Reviewed   Order Status: Completed Collected: 04/03/18 2138    Updated: 04/03/18 2143   Narrative:     NONCONTRAST CT SCANS ABDOMEN AND PELVIS     HISTORY: ab pain     COMPARISON: None.     TECHNIQUE:Radiation dose reduction techniques were utilized, including  automated exposure control and exposure modulation based on body size.   Axial images were obtained from the lung bases to the symphysis pubis  without oral or IV contrast per request.      FINDINGS ABDOMEN CT:  Extensive fatty liver. No nephrolithiasis or  hydronephrosis. Remaining solid organs are otherwise unremarkable  without benefit of IV contrast. Encompassed aorta is non-aneurysmal. No  ascites. Normal appendix.     FINDINGS PELVIS CT:  Extensive artifact from left hip arthroplasty. No  large pelvic mass or fluid collection. Numerous colonic diverticuli. The  GI tract not opacified for assessment but non obstructive in appearance.               Impression:     1.  Extensive fatty liver.  2.  Colonic diverticulosis             TSH [[id number]] (Abnormal) Collected: 04/05/18 0426   Lab Status: Final result Specimen: Blood Updated: 04/05/18 0619    TSH 5.910 (H) mIU/mL    Phosphorus [[id number]] (Normal) Collected: 04/05/18 0426   Lab Status: Final result Specimen: Blood Updated: 04/05/18 0626    Phosphorus 2.6 mg/dL    Magnesium [[id number]] (Normal) Collected: 04/05/18 0426   Lab Status: Final result Specimen: Blood Updated: 04/05/18 0559    Magnesium 2.3 mg/dL    Protime-INR [[id number]] (Normal) Collected: 04/05/18 0426   Lab Status: Final result Specimen: Blood Updated: 04/05/18 0539    Protime 12.6 Seconds     INR 0.97   Comprehensive Metabolic Panel [[id number]] (Abnormal) Collected: 04/04/18 0645   Lab Status: Final result Specimen: Blood Updated: 04/04/18 0820    Glucose 50 (L) mg/dL     BUN 28 (H) mg/dL     Creatinine 1.04 mg/dL     Sodium 137 mmol/L     Potassium 4.0 mmol/L     Chloride 98 mmol/L     CO2 14.0 (L) mmol/L     Calcium 7.6 (L) mg/dL     Total Protein 5.8 (L) g/dL     Albumin 3.50 g/dL     ALT (SGPT) 32 U/L     AST (SGOT) 36 U/L     Alkaline Phosphatase 52 U/L     Total Bilirubin 0.8 mg/dL     eGFR Non African Amer 73 mL/min/1.73     Globulin 2.3 gm/dL     A/G Ratio 1.5 g/dL     BUN/Creatinine Ratio 26.9 (H)    Anion Gap 25.0 mmol/L        PHYSICAL EXAM  Objective     Alert, nad  Soft, nt  No resp distress  RRR  No tremor or signs of w/d    CONDITION ON DISCHARGE  Stable.      DISCHARGE DISPOSITION   Home or Self Care      DISCHARGE MEDICATIONS   Dylon Lux   Home Medication Instructions ARNOLD:[id number]    Printed on:04/05/18 0905   Medication Information                      famotidine (PEPCID) 40 MG tablet  Take 1 tablet by mouth Daily.             folic acid (FOLVITE) 1 MG tablet  Take 1 tablet by mouth Daily.             multivitamin (THERAGRAN) tablet tablet  Take 1 tablet by mouth Daily.             thiamine (VITAMIN B1) 100 MG tablet  Take 1 tablet by mouth Daily.                 No future appointments.  Additional Instructions for the Follow-ups that You Need to Schedule     Discharge Follow-up with PCP    As directed      Follow Up Details:  2 weeks           Follow-up Information     Dale Kellogg MD .    Specialty:  Family Medicine  Why:  2 weeks  Contact information:  [address]  [place] [id number] [phone]                   TEST  RESULTS PENDING AT DISCHARGE         Chace Haney MD  [facility]  04/05/18  9:05 AM      Time: greater than 32 minutes on discharge  It was a pleasure taking care of this patient while in the hospital.

## 2019-05-03 RX ORDER — AMLODIPINE BESYLATE 5 MG/1
TABLET ORAL
Qty: 90 TABLET | Refills: 0 | OUTPATIENT
Start: 2019-05-03

## 2019-05-03 RX ORDER — TRAZODONE HYDROCHLORIDE 100 MG/1
TABLET ORAL
Qty: 90 TABLET | Refills: 0 | OUTPATIENT
Start: 2019-05-03

## 2019-06-10 RX ORDER — AMLODIPINE BESYLATE 5 MG/1
TABLET ORAL
Qty: 30 TABLET | Refills: 0 | OUTPATIENT
Start: 2019-06-10

## 2019-06-10 RX ORDER — TRAZODONE HYDROCHLORIDE 100 MG/1
TABLET ORAL
Qty: 30 TABLET | Refills: 0 | OUTPATIENT
Start: 2019-06-10